# Patient Record
Sex: MALE | URBAN - METROPOLITAN AREA
[De-identification: names, ages, dates, MRNs, and addresses within clinical notes are randomized per-mention and may not be internally consistent; named-entity substitution may affect disease eponyms.]

---

## 2021-10-27 ENCOUNTER — APPOINTMENT (RX ONLY)
Dept: URBAN - METROPOLITAN AREA CLINIC 139 | Facility: CLINIC | Age: 66
Setting detail: DERMATOLOGY
End: 2021-10-27

## 2021-10-27 PROBLEM — C44.319 BASAL CELL CARCINOMA OF SKIN OF OTHER PARTS OF FACE: Status: ACTIVE | Noted: 2021-10-27

## 2021-10-27 PROCEDURE — 13131 CMPLX RPR F/C/C/M/N/AX/G/H/F: CPT

## 2021-10-27 PROCEDURE — 17311 MOHS 1 STAGE H/N/HF/G: CPT

## 2021-10-27 PROCEDURE — ? MOHS SURGERY

## 2021-10-27 PROCEDURE — 17312 MOHS ADDL STAGE: CPT

## 2021-10-27 NOTE — HPI: PROCEDURE (MOHS)
Has The Growth Been Previously Biopsied?: has been previously biopsied
Body Location Override (Optional): Right lateral inferior cheek

## 2021-10-27 NOTE — PROCEDURE: MOHS SURGERY
Body Location Override (Optional - Billing Will Still Be Based On Selected Body Map Location If Applicable): right lateral inferior check

## 2021-10-27 NOTE — PROCEDURE: MOHS SURGERY
Spoke with patient's granddaughter regarding recent incidental lung findings on imaging exam.  She verbalized understanding the Radiologist's recommendations for follow-up.     Z Plasty Text: The lesion was extirpated to the level of the fat with a #15 scalpel blade.  Given the location of the defect, shape of the defect and the proximity to free margins a Z-plasty was deemed most appropriate for repair.  Using a sterile surgical marker, the appropriate transposition arms of the Z-plasty were drawn incorporating the defect and placing the expected incisions within the relaxed skin tension lines where possible.    The area thus outlined was incised deep to adipose tissue with a #15 scalpel blade.  The skin margins were undermined to an appropriate distance in all directions utilizing iris scissors.  The opposing transposition arms were then transposed into place in opposite direction and anchored with interrupted buried subcutaneous sutures.

## 2022-03-29 ENCOUNTER — APPOINTMENT (RX ONLY)
Dept: URBAN - METROPOLITAN AREA CLINIC 139 | Facility: CLINIC | Age: 67
Setting detail: DERMATOLOGY
End: 2022-03-29

## 2022-03-29 PROBLEM — C44.329 SQUAMOUS CELL CARCINOMA OF SKIN OF OTHER PARTS OF FACE: Status: ACTIVE | Noted: 2022-03-29

## 2022-03-29 PROCEDURE — ? MOHS SURGERY

## 2022-03-29 PROCEDURE — 17311 MOHS 1 STAGE H/N/HF/G: CPT

## 2022-03-29 NOTE — PROCEDURE: MOHS SURGERY
Body Location Override (Optional - Billing Will Still Be Based On Selected Body Map Location If Applicable): right later cheek per path

## 2022-05-16 NOTE — PROCEDURE: MOHS SURGERY
Stage 2: Additional Anesthesia Type: 1% lidocaine with epinephrine Muscle Hinge Flap Text: The defect edges were debeveled with a #15 scalpel blade.  Given the size, depth and location of the defect and the proximity to free margins a muscle hinge flap was deemed most appropriate.  Using a sterile surgical marker, an appropriate hinge flap was drawn incorporating the defect. The area thus outlined was incised with a #15 scalpel blade.  The skin margins were undermined to an appropriate distance in all directions utilizing iris scissors.

## 2023-05-30 ENCOUNTER — APPOINTMENT (RX ONLY)
Dept: URBAN - METROPOLITAN AREA CLINIC 139 | Facility: CLINIC | Age: 68
Setting detail: DERMATOLOGY
End: 2023-05-30

## 2023-05-30 PROBLEM — C44.311 BASAL CELL CARCINOMA OF SKIN OF NOSE: Status: ACTIVE | Noted: 2023-05-30

## 2023-05-30 PROCEDURE — ? CONSULTATION FOR MOHS SURGERY

## 2023-05-30 PROCEDURE — 99213 OFFICE O/P EST LOW 20 MIN: CPT

## 2023-05-30 NOTE — PROCEDURE: CONSULTATION FOR MOHS SURGERY
Detail Level: Detailed
Size Of Lesion: 0.8
X Size Of Lesion In Cm (Optional): 0.7
Other Plan: Patient opts XRT
Incorporate Mauc In Note: Yes
Location Indication Override (Is Already Calculated Based On Selected Body Location): Area H

## 2023-07-11 ENCOUNTER — APPOINTMENT (RX ONLY)
Dept: URBAN - METROPOLITAN AREA CLINIC 139 | Facility: CLINIC | Age: 68
Setting detail: DERMATOLOGY
End: 2023-07-11

## 2023-07-11 PROBLEM — C44.319 BASAL CELL CARCINOMA OF SKIN OF OTHER PARTS OF FACE: Status: ACTIVE | Noted: 2023-07-11

## 2023-07-11 PROCEDURE — ? MOHS SURGERY

## 2023-07-11 PROCEDURE — 17312 MOHS ADDL STAGE: CPT

## 2023-07-11 PROCEDURE — ? PRESCRIPTION

## 2023-07-11 PROCEDURE — 13131 CMPLX RPR F/C/C/M/N/AX/G/H/F: CPT

## 2023-07-11 PROCEDURE — 17311 MOHS 1 STAGE H/N/HF/G: CPT

## 2023-07-11 RX ORDER — MUPIROCIN 20 MG/G
OINTMENT TOPICAL
Qty: 1 | Refills: 3 | Status: ERX | COMMUNITY
Start: 2023-07-11

## 2023-07-11 RX ADMIN — MUPIROCIN: 20 OINTMENT TOPICAL at 00:00

## 2023-07-11 NOTE — PROCEDURE: MOHS SURGERY
08-Jun-2021 17:47 Composite Graft Text: The defect edges were debeveled with a #15 scalpel blade.  Given the location of the defect, shape of the defect, the proximity to free margins and the fact the defect was full thickness a composite graft was deemed most appropriate.  The defect was outline and then transferred to the donor site.  A full thickness graft was then excised from the donor site. The graft was then placed in the primary defect, oriented appropriately and then sutured into place.  The secondary defect was then repaired using a primary closure.

## 2023-07-11 NOTE — PROCEDURE: MOHS SURGERY
10/15/19 1011   Post-Acute Status   Post-Acute Authorization HME   HME Status Referrals Sent   Discharge Delays None known at this time       Patient qualifies for home oxygen. Dr. Neumann also ordered the patient a trilogy for home use. Orders and supporting documentation faxed to [facility]. Informed that patient is discharged today and will need portability delivered to the hospital before discharge. Patient's nurse, LINDA Yang, informed.     Susannah Humphrey LMSW     Burow's Advancement Flap Text: Due to geometric and functional constraints, a flap reconstruction was performed to reconstruct the defect. To that end, adjacent tissue was incised and carried over to close the defect in the following manner: The defect edges were debeveled with a #15 scalpel blade.  Given the location of the defect and the proximity to free margins a Burow's advancement flap was deemed most appropriate.  Using a sterile surgical marker, the appropriate advancement flap was drawn incorporating the defect and placing the expected incisions within the relaxed skin tension lines where possible.    The area thus outlined was incised deep to adipose tissue with a #15 scalpel blade.  The skin margins were undermined to an appropriate distance in all directions utilizing iris scissors.

## 2023-07-11 NOTE — PROCEDURE: MOHS SURGERY
Body Location Override (Optional - Billing Will Still Be Based On Selected Body Map Location If Applicable): Left Nasal ALA

## 2024-01-04 NOTE — PROCEDURE: MOHS SURGERY
Subjective   Patient ID: Trinity Hernandez is a 75 y.o. female who presents for COPD, sarcoidosis    HPI    Ms Hernandez is a 75yr old with severe COPD and a history of sarcoidosis diagnosed in 2007 by biopsy but in remission for years.  She has had several admissions for CHF/COPD exacerbation/pneumonia over the last few months.  She feels she is sent home too early and ends up right back in the hospital.  She has been seen by Dr Ford at [facility] for quite a while.  She was told to come here to be evaluated for her sarcoidosis.  She is currently using brovana, pulmicort with albuterol as needed and prednisone 20mg daily.  She was on chronic azithromycin 250 MWF but ran out of that.  Her baseline prednisone has been 10mg but lately when she drops down to that she gets worse.    She would like to continue following with Dr Ford and has an appointment with him Jan. 19th      Review of Systems    See scanned documents attached to this note for review of systems, and appropriate scales/scores for this visit.     Objective   Physical Exam  Constitutional:       Appearance: Normal appearance.   HENT:      Head: Normocephalic and atraumatic.      Mouth/Throat:      Pharynx: Oropharynx is clear.   Cardiovascular:      Rate and Rhythm: Normal rate and regular rhythm.      Pulses: Normal pulses.      Heart sounds: Normal heart sounds.   Pulmonary:      Effort: Pulmonary effort is normal.      Breath sounds: Wheezing, rhonchi and rales present.   Abdominal:      General: Bowel sounds are normal.      Palpations: Abdomen is soft.   Musculoskeletal:         General: Normal range of motion.   Skin:     General: Skin is warm and dry.   Neurological:      General: No focal deficit present.      Mental Status: She is alert and oriented to person, place, and time.   Psychiatric:         Mood and Affect: Mood normal.             Assessment/Plan      75yr old with severe COPD with several exacerbations recently.  History of  sarcoidosis    COPD:  FEV1 in  system from 2013 shows FEV1 32% predicted.  Very severe  - continue brovana, pulmicort, spiriva  - Rx for prednisone 40mg x 5 days, 30mg x 3 days, then 20mg until she sees Dr Ford  - renewed azitrhomycin to cover her until she sees Dr Ford  - She felt the theravest was very helpful for her when in the hospital  - suggested discussing with Dr Ford at next visit.  - she seems like a good candidate for daliresp (again she should discuss with Dr oFrd    2. Sarcoidosis:  no evidence of sarcoid flair up on CT from Oct 2023 and even if there is a component of that steroids are the treatment    3. Hypoxia:  chronic  - continue O2    Follow up with Dr Ford as scheduled       Javy Raya MD 01/04/24 1:20 PM    Complex Repair And Graft Additional Text (Will Appearing After The Standard Complex Repair Text): The complex repair was not sufficient to completely close the primary defect. The remaining additional defect was repaired with the graft mentioned below.